# Patient Record
Sex: FEMALE | Race: WHITE | Employment: OTHER | URBAN - METROPOLITAN AREA
[De-identification: names, ages, dates, MRNs, and addresses within clinical notes are randomized per-mention and may not be internally consistent; named-entity substitution may affect disease eponyms.]

---

## 2023-01-19 ENCOUNTER — HOSPITAL ENCOUNTER (OUTPATIENT)
Dept: PREADMISSION TESTING | Age: 78
Discharge: HOME | End: 2023-01-19
Payer: MEDICARE

## 2023-01-19 ENCOUNTER — TRANSCRIBE ORDER (OUTPATIENT)
Dept: REGISTRATION | Age: 78
End: 2023-01-19

## 2023-01-19 DIAGNOSIS — N84.0 POLYP OF CORPUS UTERI: ICD-10-CM

## 2023-01-19 DIAGNOSIS — N90.9: ICD-10-CM

## 2023-01-19 DIAGNOSIS — N95.0 POSTMENOPAUSAL BLEEDING: ICD-10-CM

## 2023-01-19 DIAGNOSIS — D25.9 LEIOMYOMA OF UTERUS, UNSPECIFIED: Primary | ICD-10-CM

## 2023-01-19 DIAGNOSIS — D25.9 LEIOMYOMA OF UTERUS, UNSPECIFIED: ICD-10-CM

## 2023-01-19 LAB
ANION GAP SERPL CALC-SCNC: 2 MMOL/L (ref 3–18)
ATRIAL RATE: 73 BPM
BASOPHILS # BLD: 0 K/UL (ref 0–0.1)
BASOPHILS NFR BLD: 1 % (ref 0–2)
BUN SERPL-MCNC: 17 MG/DL (ref 7–18)
BUN/CREAT SERPL: 25 (ref 12–20)
CALCIUM SERPL-MCNC: 9.4 MG/DL (ref 8.5–10.1)
CALCULATED P AXIS, ECG09: 80 DEGREES
CALCULATED R AXIS, ECG10: 75 DEGREES
CALCULATED T AXIS, ECG11: 57 DEGREES
CHLORIDE SERPL-SCNC: 99 MMOL/L (ref 100–111)
CO2 SERPL-SCNC: 33 MMOL/L (ref 21–32)
CREAT SERPL-MCNC: 0.67 MG/DL (ref 0.6–1.3)
DIAGNOSIS, 93000: NORMAL
DIFFERENTIAL METHOD BLD: ABNORMAL
EOSINOPHIL # BLD: 0.1 K/UL (ref 0–0.4)
EOSINOPHIL NFR BLD: 2 % (ref 0–5)
ERYTHROCYTE [DISTWIDTH] IN BLOOD BY AUTOMATED COUNT: 13.3 % (ref 11.6–14.5)
GLUCOSE SERPL-MCNC: 96 MG/DL (ref 74–99)
HCT VFR BLD AUTO: 36.1 % (ref 35–45)
HGB BLD-MCNC: 11.9 G/DL (ref 12–16)
IMM GRANULOCYTES # BLD AUTO: 0 K/UL (ref 0–0.04)
IMM GRANULOCYTES NFR BLD AUTO: 1 % (ref 0–0.5)
LYMPHOCYTES # BLD: 1.2 K/UL (ref 0.9–3.6)
LYMPHOCYTES NFR BLD: 18 % (ref 21–52)
MCH RBC QN AUTO: 32.5 PG (ref 24–34)
MCHC RBC AUTO-ENTMCNC: 33 G/DL (ref 31–37)
MCV RBC AUTO: 98.6 FL (ref 78–100)
MONOCYTES # BLD: 0.8 K/UL (ref 0.05–1.2)
MONOCYTES NFR BLD: 12 % (ref 3–10)
NEUTS SEG # BLD: 4.4 K/UL (ref 1.8–8)
NEUTS SEG NFR BLD: 67 % (ref 40–73)
NRBC # BLD: 0 K/UL (ref 0–0.01)
NRBC BLD-RTO: 0 PER 100 WBC
P-R INTERVAL, ECG05: 170 MS
PLATELET # BLD AUTO: 264 K/UL (ref 135–420)
PMV BLD AUTO: 10.1 FL (ref 9.2–11.8)
POTASSIUM SERPL-SCNC: 4.5 MMOL/L (ref 3.5–5.5)
Q-T INTERVAL, ECG07: 404 MS
QRS DURATION, ECG06: 88 MS
QTC CALCULATION (BEZET), ECG08: 445 MS
RBC # BLD AUTO: 3.66 M/UL (ref 4.2–5.3)
SODIUM SERPL-SCNC: 134 MMOL/L (ref 136–145)
VENTRICULAR RATE, ECG03: 73 BPM
WBC # BLD AUTO: 6.6 K/UL (ref 4.6–13.2)

## 2023-01-19 PROCEDURE — 80048 BASIC METABOLIC PNL TOTAL CA: CPT

## 2023-01-19 PROCEDURE — 85025 COMPLETE CBC W/AUTO DIFF WBC: CPT

## 2023-01-19 PROCEDURE — 36415 COLL VENOUS BLD VENIPUNCTURE: CPT

## 2023-01-19 PROCEDURE — 93005 ELECTROCARDIOGRAM TRACING: CPT

## 2023-01-24 ENCOUNTER — HOSPITAL ENCOUNTER (OUTPATIENT)
Dept: PREADMISSION TESTING | Age: 78
Discharge: HOME | End: 2023-01-24

## 2023-01-24 VITALS — WEIGHT: 145 LBS | HEIGHT: 65 IN | BODY MASS INDEX: 24.16 KG/M2

## 2023-01-24 RX ORDER — SODIUM CHLORIDE, SODIUM LACTATE, POTASSIUM CHLORIDE, CALCIUM CHLORIDE 600; 310; 30; 20 MG/100ML; MG/100ML; MG/100ML; MG/100ML
125 INJECTION, SOLUTION INTRAVENOUS CONTINUOUS
Status: CANCELLED | OUTPATIENT
Start: 2023-01-24

## 2023-01-24 RX ORDER — AMLODIPINE BESYLATE 5 MG/1
5 TABLET ORAL DAILY
COMMUNITY

## 2023-01-24 RX ORDER — LEVOTHYROXINE SODIUM 75 UG/1
75 TABLET ORAL
COMMUNITY

## 2023-01-24 RX ORDER — ASCORBIC ACID 500 MG
500 TABLET ORAL
COMMUNITY

## 2023-01-24 RX ORDER — DEXTROMETHORPHAN HYDROBROMIDE, GUAIFENESIN 5; 100 MG/5ML; MG/5ML
1350 LIQUID ORAL 2 TIMES DAILY
COMMUNITY

## 2023-01-24 RX ORDER — GLUCOSAMINE SULFATE 1500 MG
1000 POWDER IN PACKET (EA) ORAL
COMMUNITY

## 2023-01-24 RX ORDER — LOSARTAN POTASSIUM AND HYDROCHLOROTHIAZIDE 25; 100 MG/1; MG/1
1 TABLET ORAL DAILY
COMMUNITY

## 2023-01-24 NOTE — PERIOP NOTES
Dr Mode Ruelas office called and spoke to [facility] re: requested a revised posting for surgery without abbreviations.

## 2023-01-24 NOTE — PERIOP NOTES
Operative consent filled out according to MD order on surgical posting, verbatim. Patient instructed to not bring any valuables on DOS including Pocketbook, wallet, jewelry, cell phone, lap top computer or tablet. Patient instructed not to wear make up, powders, deodorant, creams, or lotions on DOS. Patient instructed in the use of dial soap pre-op.

## 2023-02-02 NOTE — H&P (VIEW-ONLY)
[address] [address] [place] [address]  Tel: [phone]  Fax: [phone]    Patient: Iban Dinh   YOB: 1945   Birth Sex: Female      Current Gender: Female      Date:  2023 10:00 AM    Visit Type: Office Visit - GYN       This 76year old female presents for gyn visit:.    History of Present Illness  1. PMB and vulvar mass: The symptoms began on 2022 and generally lasts 8 Weeks. The location is uterine. The symptoms are described as painless. Aggravating factors include postmenopausal bleeding, leiomyoma of uterus, polyp of corpus uteri, noninflammatory disorder of vulva. The patient states the symptoms are acute and are of new onset. pt presents for pre-op visit. pt is scheduled for [facility] D&C cervical polypectomy, L labia minora excision of mass (<1cm) on 2022. Gynecologic History  Patient is postmenopausal.   No hormone replacement therapy. 2023 10:00 AM  Pregnancy # Birth order Date Delivery Type GA(wks) Labor(hrs) Weight Sex   1  [id number]     Female   2  [id number]     Female   Medical History  (Detailed)    Surgical History/Management  (Detailed)  Management Date Comments   Lumbar Spine Fusion         Problem List  Problem List reviewed.        Medications (active prior to today)  Medication Instructions Start Date Stop Date Refilled Elsewhere   Vitamin C 500 mg tablet  2022   N   Vitamin D3 25 mcg (1,000 unit) tablet  2022   N   Centrum Silver Women 8 mg iron-400 mcg-300 mcg tablet  2022   N   levothyroxine 75 mcg tablet take 1 tablet by oral route  every day 2022   N   amlodipine 5 mg tablet take 1 tablet by oral route  every day 2022   N   losartan 100 mg-hydrochlorothiazide 25 mg tablet take 1 tablet by oral route  every day 2022   N   Refresh Optive Advanced (PF) 0.5 %-1 %-0.5 % eye drops in dropperette  2022   N   GenTeal Tears Severe Gel Drops 0.4 %-0.3 % eye drops 2022   N       Medication Reconciliation  Medications reconciled today. Allergies  Ingredient Reaction (Severity) Medication Name Comment   ASPIRIN Itching           Family History    (Detailed)  Relationship Family Member Name  Age at Death Condition Onset Age Cause of Death       Family history of Stroke  N       Family history of Hypertension  N   Father    Cancer, prostate  N   Mother    Cancer, breast  N     Social History  (Detailed)  Preferred language is English. Marital Status/Family/Social Support  Marital status:        Review of Systems  System Neg/Pos Details   Constitutional Negative Chills, Fatigue and Fever. ENMT Negative Hearing loss, Otalgia and Sore throat. Eyes Negative Eye discharge, Eye pain and Vision changes. Respiratory Negative Cough, Dyspnea and Wheezing. Cardio Negative Chest pain and Edema. GI Positive Abdominal pain (Location RLQ). GI Negative Constipation, Diarrhea, Heartburn, Nausea and Vomiting.  Negative Dysuria, Hematuria, Urinary frequency, Urinary incontinence and Urinary retention. Endocrine Negative Cold intolerance, Heat intolerance, Polydipsia and Polyphagia. Neuro Negative Dizziness, Gait disturbance and Headache. Psych Negative Anxiety and Depression. Integumentary Negative Pruritus and Rash. MS Positive Rheumatologic manifestations. MS Negative Back pain, Joint pain and Joint swelling. Hema/Lymph Negative Easy bleeding and Easy bruising.        Vital Signs   Gynecologic History  Patient is postmenopausal.      Height  Time ft in cm Last Measured Height Position   10:17 AM 5.0 5.00 165.10 2022 0     Weight/BSA/BMI  Time lb oz kg Context BMI kg/m2 BSA m2   10:17 .20  63.594  23.33 1.71     Blood Pressure  Time BP mm/Hg Position Side Site Method Cuff Size   10:17 /60          Measured by  Time Measured by   10:17 AM Nicolette Gagnon         Physical Exam  Exam Findings Details   Constitutional Normal No acute distress. Well nourished. Well developed. Respiratory Normal Effort - Normal.   Psychiatric Normal Orientation - Oriented to time, place, person & situation. Appropriate mood and affect. Assessment/Plan  # Detail Type Description    1. Assessment Postmenopausal bleeding (N95.0). Patient Plan Brownish discharge/ spotting. Assoicated RLQ aching occ. I d/w patient possible etiologies, evaluation and management. TVUS reviewed w/ patient. Plans for [facility] D&C polypectomy pending auth. 11/14/22 TT --> Persistent. Q/A. Cytotec/Ibuprofen/Perc; ERx. Proceed w/ [facility] D&[facility] cervical polypectomy. 1/12/23 TT  - TVUS (10/26/22): AV uterus (5.4 x 2.5 x 2.8 cm); ET (6mm); ovaries not seen. Scattered calcifications, possible fibroids. 2. Assessment Leiomyoma of uterus (D25.9). Patient Plan See above. 3. Assessment Polyp of corpus uteri (N84.0). Patient Plan Cervical polyp/cyst.  Plans for excision. 4. Assessment Noninflammatory disorder of vulva (N90.9). Patient Plan L labia minora: 1 cm polyploidy tissue. She opts for excision. --> Patient does not recall this. Declined PE today. Will reassess prior to surgery. 1/12/23 TT         5. Assessment Postmenopausal atrophic vaginitis (N95.2). Patient Plan Atrophic Vaginitis: thinning and inflammation of the vaginal walls due to a decline in estrogen. Recommend localized estrogen treatment and vaginal moisturizers/lubricants prn. Asymptomatic. 6. Assessment Other specified hypothyroidism (E03.8). Patient Plan Stable on meds. Cont close f/u w/ PCP/Endo. 7. Assessment Essential (primary) hypertension (I10). Patient Plan Elevated despite meds. Cont close BP monitoring and f/u w/ PCP. [facility] D&C cervical polypectomy, L labia minora biopsy. 1/12/23 TT    R/B/A d/w patient.   She understands that she is at increased risk for, but not limited to, infection, bleeding, blood transfusion, and/or damage to neighboring organs. All questions answered at this time. Medications (Added, Continued or Stopped today)  Start Date Medication Directions PRN Status PRN Reason Instruction Stop Date   11/14/2022 amlodipine 5 mg tablet take 1 tablet by oral route  every day N      11/14/2022 Centrum Silver Women 8 mg iron-400 mcg-300 mcg tablet  N      01/12/2023 Cytotec 200 mcg tablet place 2 tablets in vaginally at bedtime the night prior to surgery N      11/14/2022 GenTeal Tears Severe Gel Drops 0.4 %-0.3 % eye drops  N      01/12/2023 ibuprofen 800 mg tablet take 1 tablet by oral route  every 8 hours with food as needed for pain N      11/14/2022 levothyroxine 75 mcg tablet take 1 tablet by oral route  every day N      11/14/2022 losartan 100 mg-hydrochlorothiazide 25 mg tablet take 1 tablet by oral route  every day N      01/12/2023 oxycodone-acetaminophen 5 mg-325 mg tablet take 1 - 2 tablet by oral route  every 4 - 6 hours as needed; do not exceed 6 tabs in 24 hours N      11/14/2022 Refresh Optive Advanced (PF) 0.5 %-1 %-0.5 % eye drops in dropperette  N      11/14/2022 Vitamin C 500 mg tablet  N      11/14/2022 Vitamin D3 25 mcg (1,000 unit) tablet  N        Prescription Drug Monitoring Report: Accessed by Ngozi Owens.  Génesis Kramer MD on 1/12/2023 10:24:09 AM      Provider:    Rosa Crabtree MD 01/12/2023 10:29 AM     Document generated by: Keo Moore 01/12/2023 10:28 AM      ----------------------------------------------------------------------------------------------------------------------------------------------------------------------

## 2023-02-08 ENCOUNTER — HOSPITAL ENCOUNTER (OUTPATIENT)
Age: 78
Setting detail: OUTPATIENT SURGERY
Discharge: HOME | End: 2023-02-08
Attending: OBSTETRICS & GYNECOLOGY | Admitting: OBSTETRICS & GYNECOLOGY
Payer: MEDICARE

## 2023-02-08 ENCOUNTER — ANESTHESIA (OUTPATIENT)
Dept: SURGERY | Age: 78
End: 2023-02-08
Payer: MEDICARE

## 2023-02-08 ENCOUNTER — ANESTHESIA EVENT (OUTPATIENT)
Dept: SURGERY | Age: 78
End: 2023-02-08
Payer: MEDICARE

## 2023-02-08 VITALS
RESPIRATION RATE: 9 BRPM | OXYGEN SATURATION: 100 % | TEMPERATURE: 97.6 F | BODY MASS INDEX: 23.39 KG/M2 | WEIGHT: 140.4 LBS | HEIGHT: 65 IN | HEART RATE: 67 BPM | SYSTOLIC BLOOD PRESSURE: 165 MMHG | DIASTOLIC BLOOD PRESSURE: 60 MMHG

## 2023-02-08 PROBLEM — N84.1 POLYP OF CERVIX UTERI: Chronic | Status: RESOLVED | Noted: 2023-02-08 | Resolved: 2023-02-08

## 2023-02-08 PROBLEM — N90.9 NONINFLAMMATORY DISORDER OF VULVA: Chronic | Status: ACTIVE | Noted: 2023-02-08

## 2023-02-08 PROBLEM — N84.1 POLYP OF CERVIX UTERI: Chronic | Status: ACTIVE | Noted: 2023-02-08

## 2023-02-08 PROBLEM — N90.9 NONINFLAMMATORY DISORDER OF VULVA: Chronic | Status: RESOLVED | Noted: 2023-02-08 | Resolved: 2023-02-08

## 2023-02-08 PROBLEM — N95.0 POSTMENOPAUSAL BLEEDING: Chronic | Status: ACTIVE | Noted: 2023-02-08

## 2023-02-08 PROCEDURE — 76210000020 HC REC RM PH II FIRST 0.5 HR: Performed by: OBSTETRICS & GYNECOLOGY

## 2023-02-08 PROCEDURE — 2709999900 HC NON-CHARGEABLE SUPPLY: Performed by: OBSTETRICS & GYNECOLOGY

## 2023-02-08 PROCEDURE — 74011250636 HC RX REV CODE- 250/636: Performed by: OBSTETRICS & GYNECOLOGY

## 2023-02-08 PROCEDURE — 77030020782 HC GWN BAIR PAWS FLX 3M -B: Performed by: OBSTETRICS & GYNECOLOGY

## 2023-02-08 PROCEDURE — 76060000032 HC ANESTHESIA 0.5 TO 1 HR: Performed by: OBSTETRICS & GYNECOLOGY

## 2023-02-08 PROCEDURE — 74011000250 HC RX REV CODE- 250: Performed by: OBSTETRICS & GYNECOLOGY

## 2023-02-08 PROCEDURE — 74011250636 HC RX REV CODE- 250/636

## 2023-02-08 PROCEDURE — 88305 TISSUE EXAM BY PATHOLOGIST: CPT

## 2023-02-08 PROCEDURE — 77030040361 HC SLV COMPR DVT MDII -B: Performed by: OBSTETRICS & GYNECOLOGY

## 2023-02-08 PROCEDURE — 77030005537 HC CATH URETH BARD -A: Performed by: OBSTETRICS & GYNECOLOGY

## 2023-02-08 PROCEDURE — 77010033678 HC OXYGEN DAILY

## 2023-02-08 PROCEDURE — 77030019927 HC TBNG IRR CYSTO BAXT -A: Performed by: OBSTETRICS & GYNECOLOGY

## 2023-02-08 PROCEDURE — 77030012508 HC MSK AIRWY LMA AMBU -A: Performed by: SPECIALIST

## 2023-02-08 PROCEDURE — 74011000250 HC RX REV CODE- 250

## 2023-02-08 PROCEDURE — 76010000138 HC OR TIME 0.5 TO 1 HR: Performed by: OBSTETRICS & GYNECOLOGY

## 2023-02-08 PROCEDURE — 76210000063 HC OR PH I REC FIRST 0.5 HR: Performed by: OBSTETRICS & GYNECOLOGY

## 2023-02-08 RX ORDER — NALOXONE HYDROCHLORIDE 0.4 MG/ML
0.1 INJECTION, SOLUTION INTRAMUSCULAR; INTRAVENOUS; SUBCUTANEOUS
Status: DISCONTINUED | OUTPATIENT
Start: 2023-02-08 | End: 2023-02-08 | Stop reason: HOSPADM

## 2023-02-08 RX ORDER — OXYCODONE AND ACETAMINOPHEN 5; 325 MG/1; MG/1
1 TABLET ORAL AS NEEDED
Status: DISCONTINUED | OUTPATIENT
Start: 2023-02-08 | End: 2023-02-08 | Stop reason: HOSPADM

## 2023-02-08 RX ORDER — FENTANYL CITRATE 50 UG/ML
INJECTION, SOLUTION INTRAMUSCULAR; INTRAVENOUS AS NEEDED
Status: DISCONTINUED | OUTPATIENT
Start: 2023-02-08 | End: 2023-02-08 | Stop reason: HOSPADM

## 2023-02-08 RX ORDER — ONDANSETRON 2 MG/ML
4 INJECTION INTRAMUSCULAR; INTRAVENOUS ONCE
Status: DISCONTINUED | OUTPATIENT
Start: 2023-02-08 | End: 2023-02-08 | Stop reason: HOSPADM

## 2023-02-08 RX ORDER — KETOROLAC TROMETHAMINE 15 MG/ML
INJECTION, SOLUTION INTRAMUSCULAR; INTRAVENOUS AS NEEDED
Status: DISCONTINUED | OUTPATIENT
Start: 2023-02-08 | End: 2023-02-08 | Stop reason: HOSPADM

## 2023-02-08 RX ORDER — HYDROMORPHONE HYDROCHLORIDE 1 MG/ML
0.5 INJECTION, SOLUTION INTRAMUSCULAR; INTRAVENOUS; SUBCUTANEOUS
Status: DISCONTINUED | OUTPATIENT
Start: 2023-02-08 | End: 2023-02-08 | Stop reason: HOSPADM

## 2023-02-08 RX ORDER — MIDAZOLAM HYDROCHLORIDE 1 MG/ML
INJECTION, SOLUTION INTRAMUSCULAR; INTRAVENOUS AS NEEDED
Status: DISCONTINUED | OUTPATIENT
Start: 2023-02-08 | End: 2023-02-08 | Stop reason: HOSPADM

## 2023-02-08 RX ORDER — BUPIVACAINE HYDROCHLORIDE 2.5 MG/ML
INJECTION, SOLUTION EPIDURAL; INFILTRATION; INTRACAUDAL AS NEEDED
Status: DISCONTINUED | OUTPATIENT
Start: 2023-02-08 | End: 2023-02-08 | Stop reason: HOSPADM

## 2023-02-08 RX ORDER — DEXAMETHASONE SODIUM PHOSPHATE 4 MG/ML
INJECTION, SOLUTION INTRA-ARTICULAR; INTRALESIONAL; INTRAMUSCULAR; INTRAVENOUS; SOFT TISSUE AS NEEDED
Status: DISCONTINUED | OUTPATIENT
Start: 2023-02-08 | End: 2023-02-08 | Stop reason: HOSPADM

## 2023-02-08 RX ORDER — LIDOCAINE HYDROCHLORIDE 20 MG/ML
INJECTION, SOLUTION EPIDURAL; INFILTRATION; INTRACAUDAL; PERINEURAL AS NEEDED
Status: DISCONTINUED | OUTPATIENT
Start: 2023-02-08 | End: 2023-02-08 | Stop reason: HOSPADM

## 2023-02-08 RX ORDER — ONDANSETRON 2 MG/ML
INJECTION INTRAMUSCULAR; INTRAVENOUS AS NEEDED
Status: DISCONTINUED | OUTPATIENT
Start: 2023-02-08 | End: 2023-02-08 | Stop reason: HOSPADM

## 2023-02-08 RX ORDER — PROPOFOL 10 MG/ML
INJECTION, EMULSION INTRAVENOUS AS NEEDED
Status: DISCONTINUED | OUTPATIENT
Start: 2023-02-08 | End: 2023-02-08 | Stop reason: HOSPADM

## 2023-02-08 RX ORDER — FENTANYL CITRATE 50 UG/ML
25 INJECTION, SOLUTION INTRAMUSCULAR; INTRAVENOUS
Status: DISCONTINUED | OUTPATIENT
Start: 2023-02-08 | End: 2023-02-08 | Stop reason: HOSPADM

## 2023-02-08 RX ORDER — SODIUM CHLORIDE, SODIUM LACTATE, POTASSIUM CHLORIDE, CALCIUM CHLORIDE 600; 310; 30; 20 MG/100ML; MG/100ML; MG/100ML; MG/100ML
50 INJECTION, SOLUTION INTRAVENOUS CONTINUOUS
Status: DISCONTINUED | OUTPATIENT
Start: 2023-02-08 | End: 2023-02-08 | Stop reason: HOSPADM

## 2023-02-08 RX ORDER — SODIUM CHLORIDE, SODIUM LACTATE, POTASSIUM CHLORIDE, CALCIUM CHLORIDE 600; 310; 30; 20 MG/100ML; MG/100ML; MG/100ML; MG/100ML
125 INJECTION, SOLUTION INTRAVENOUS CONTINUOUS
Status: DISCONTINUED | OUTPATIENT
Start: 2023-02-08 | End: 2023-02-08 | Stop reason: HOSPADM

## 2023-02-08 RX ADMIN — PROPOFOL 120 MG: 10 INJECTION, EMULSION INTRAVENOUS at 15:17

## 2023-02-08 RX ADMIN — LIDOCAINE HYDROCHLORIDE 80 MG: 20 INJECTION, SOLUTION EPIDURAL; INFILTRATION; INTRACAUDAL; PERINEURAL at 15:17

## 2023-02-08 RX ADMIN — SODIUM CHLORIDE, POTASSIUM CHLORIDE, SODIUM LACTATE AND CALCIUM CHLORIDE 125 ML/HR: 600; 310; 30; 20 INJECTION, SOLUTION INTRAVENOUS at 13:16

## 2023-02-08 RX ADMIN — MIDAZOLAM 2 MG: 1 INJECTION INTRAMUSCULAR; INTRAVENOUS at 15:13

## 2023-02-08 RX ADMIN — FENTANYL CITRATE 25 MCG: 50 INJECTION, SOLUTION INTRAMUSCULAR; INTRAVENOUS at 15:21

## 2023-02-08 RX ADMIN — ONDANSETRON HYDROCHLORIDE 4 MG: 2 INJECTION INTRAMUSCULAR; INTRAVENOUS at 15:40

## 2023-02-08 RX ADMIN — KETOROLAC TROMETHAMINE 15 MG: 15 INJECTION, SOLUTION INTRAMUSCULAR; INTRAVENOUS at 15:40

## 2023-02-08 RX ADMIN — FENTANYL CITRATE 25 MCG: 50 INJECTION, SOLUTION INTRAMUSCULAR; INTRAVENOUS at 15:15

## 2023-02-08 RX ADMIN — DEXAMETHASONE SODIUM PHOSPHATE 8 MG: 4 INJECTION, SOLUTION INTRAMUSCULAR; INTRAVENOUS at 15:25

## 2023-02-08 NOTE — ANESTHESIA PREPROCEDURE EVALUATION
Relevant Problems   No relevant active problems       Anesthetic History               Review of Systems / Medical History  Patient summary reviewed, nursing notes reviewed and pertinent labs reviewed    Pulmonary  Within defined limits                 Neuro/Psych   Within defined limits           Cardiovascular    Hypertension              Exercise tolerance: >4 METS     GI/Hepatic/Renal  Within defined limits              Endo/Other      Hypothyroidism  Arthritis     Other Findings              Physical Exam    Airway  Mallampati: II  TM Distance: 4 - 6 cm  Neck ROM: normal range of motion   Mouth opening: Normal     Cardiovascular               Dental  No notable dental hx       Pulmonary                 Abdominal         Other Findings            Anesthetic Plan    ASA: 2  Anesthesia type: general          Induction: Intravenous  Anesthetic plan and risks discussed with: Patient

## 2023-02-08 NOTE — PERIOP NOTES
Reviewed PTA medication list with patient/caregiver and patient/caregiver denies any additional medications. Patient admits to having a responsible adult care for them at home for at least 24 hours after surgery. Patient encouraged to use gown warming system and informed that using said warming gown to regulate body temperature prior to a procedure has been shown to help reduce the risks of blood clots and infection. Patient's pharmacy of choice verified and documented in PTA medication section.     Dual skin assessment & fall risk band verification completed with Camilla Danielle RN.

## 2023-02-08 NOTE — PERIOP NOTES
TRANSFER - IN REPORT:    Verbal report received from CRNA and OR nurse(name) on [facility]  being received from OR(unit) for routine post - op      Report consisted of patients Situation, Background, Assessment and   Recommendations(SBAR). Information from the following report(s) SBAR, Kardex, OR Summary, Procedure Summary, Intake/Output, and MAR was reviewed with the receiving nurse. Opportunity for questions and clarification was provided. Assessment completed upon patients arrival to unit and care assumed.

## 2023-02-08 NOTE — DISCHARGE INSTRUCTIONS
[facility], [address] [facility], [address], [address], [place], [address] [facility]  Office: [phone]  Fax:    [phone]    PROCEDURE: Procedure(s): HYSTEROSCOPY, DILATATION AND CURETTAGE  CERVICAL POLYPECTOMY, LEFT LABIA MINORA EXCISION OF MASS < 1CM)    Notify [facility] IMMEDIATELY if any of the following occur: You are unable to urinate. Urgency to urinate is not uncommon. Excessive vaginal bleeding > 1 maxi pad an hour for more then 2 hours straight. Temperature above 101.0° and / or chills. You are nauseous and / or vomiting and you cannot hold down any fluids. Your pain is not controlled with the pain medication prescribed. Special Considerations:     Do not drive for at least 24 hours after the procedure and until you are no longer taking narcotic pain medication and you are able to move and react without hesitation. You may return to work the following day. [x] Pelvic rest (nothing in the vagina) for 2 weeks. [] No heavy lifting over 10 pounds & no strenuous exercise for 6 weeks. [] Other instructions:         MEDICATIONS: PROVIDED AT DISCHARGE OR PREVIOUSLY PRESCRIBED AT PRE OPERATIVE APPOINTMENT WITH [facility] --  Narcotic Pain Med.   []  Vicodin®   [x]  Percocet®   []  Dilaudid®    Non-narcotic Pain Med. [x]   Ibuprofen        Antibiotics   []  Cipro®   []  Keflex®     []  Bactrim DS®       Urgency   []   Vesicare®       Nausea      []    Zofran®     []    Phenergan®       Other   []    Colace    Aquaphor - over the counter medication to apply to the vulvar area as needed to protect the skin to prevent itching and infection. If you have not already scheduled your post operative appointment please do so with our office staff. Your post partum appointment should be in 2 weeks.     Please contact [facility] at [phone] or go to the nearest Emergency Department / Urgent Care facility for any other medical questions or concerns. DISCHARGE SUMMARY from Nurse    PATIENT INSTRUCTIONS:    After general anesthesia or intravenous sedation, for 24 hours or while taking prescription Narcotics:  Limit your activities  Do not drive and operate hazardous machinery  Do not make important personal or business decisions  Do  not drink alcoholic beverages  If you have not urinated within 8 hours after discharge, please contact your surgeon on call. Report the following to your surgeon:  Excessive pain, swelling, redness or odor of or around the surgical area  Temperature over 100.5  Nausea and vomiting lasting longer than 4 hours or if unable to take medications  Any signs of decreased circulation or nerve impairment to extremity: change in color, persistent  numbness, tingling, coldness or increase pain  Any questions    What to do at Home:  Recommended activity: Activity as tolerated        *  Please give a list of your current medications to your Primary Care Provider. *  Please update this list whenever your medications are discontinued, doses are      changed, or new medications (including over-the-counter products) are added. *  Please carry medication information at all times in case of emergency situations. These are general instructions for a healthy lifestyle:    No smoking/ No tobacco products/ Avoid exposure to second hand smoke  Surgeon General's Warning:  Quitting smoking now greatly reduces serious risk to your health.     Obesity, smoking, and sedentary lifestyle greatly increases your risk for illness    A healthy diet, regular physical exercise & weight monitoring are important for maintaining a healthy lifestyle    You may be retaining fluid if you have a history of heart failure or if you experience any of the following symptoms:  Weight gain of 3 pounds or more overnight or 5 pounds in a week, increased swelling in our hands or feet or shortness of breath while lying flat in bed. Please call your doctor as soon as you notice any of these symptoms; do not wait until your next office visit. The discharge information has been reviewed with the patient and caregiver. The patient and caregiver verbalized understanding. Discharge medications reviewed with the patient and caregiver and appropriate educational materials and side effects teaching were provided.     Patient armband removed and shredded

## 2023-02-08 NOTE — ANESTHESIA POSTPROCEDURE EVALUATION
Post-Anesthesia Evaluation and Assessment    Cardiovascular Function/Vital Signs  Visit Vitals  BP (!) 147/61   Pulse 67   Temp 36.4 °C (97.6 °F)   Resp 12   Ht 5' 4.5\" (1.638 m)   Wt 63.7 kg (140 lb 6.4 oz)   SpO2 100%   BMI 23.73 kg/m²       Patient is status post Procedure(s): HYSTEROSCOPY, DILATATION AND CURETTAGE  CERVICAL POLYPECTOMY, LEFT LABIA MINORA EXCISION OF MASS < 1CM). Nausea/Vomiting: Controlled. Postoperative hydration reviewed and adequate. Pain:  Pain Scale 1: Numeric (0 - 10) (02/08/23 1613)  Pain Intensity 1: 0 (02/08/23 1613)   Managed. Neurological Status:   Neuro (WDL): Within Defined Limits (02/08/23 1613)   At baseline. Mental Status and Level of Consciousness: Baseline and appropriate for discharge. Pulmonary Status:   O2 Device: None (Room air) (02/08/23 1613)   Adequate oxygenation and airway patent. Complications related to anesthesia: None    Post-anesthesia assessment completed. No concerns. Patient has met all discharge requirements.     Signed By: Casimiro Porras MD    February 8, 2023

## 2023-02-08 NOTE — INTERVAL H&P NOTE
Update History & Physical    The Patient's History and Physical of January 12, 2023 was reviewed with the patient and I examined the patient. There was no change. The surgical site was confirmed by the patient and me. Plan:  The risk, benefits, expected outcome, and alternative to the recommended procedure have been discussed with the patient. Patient understands and wants to proceed with the procedure.     Electronically signed by Rosangela Holliday MD on 2/8/2023 at 2:56 PM

## 2023-02-09 NOTE — OP NOTES
[facility] [place]  OPERATIVE REPORT    Name:  Son Friday  MR#:   [id number]  :  1945  ACCOUNT #:  [de-identified]  DATE OF SERVICE:  2023    PREOPERATIVE DIAGNOSES:  1. Postmenopausal bleeding. 2.  Endocervical polyp. 3.  Left polypoid mass of the left labia minora. POSTOPERATIVE DIAGNOSES:  1. Postmenopausal bleeding. 2.  Endocervical polyp. 3.  Left polypoid mass of the left labia minora. PROCEDURE PERFORMED:  1. Hysteroscopy, dilation and curettage, cervical polypectomy. 2.  Left labia minora excision of mass. SURGEON:  Gini Mackey MD    ASSISTANT:  ***. ANESTHESIOLOGIST:  Joni Marquez MD    ANESTHESIA:  General and paracervical block. COMPLICATIONS:  None. SPECIMENS REMOVED:  1.  Endometrial curettings. 2.  Endocervical polyps. 3.  Left labia minora mass. IMPLANTS:  None. ESTIMATED BLOOD LOSS:  Minimal.    IV FLUIDS:  700 mL of lactated Ringer's. FINDINGS:  1.  A small anteverted uterus with normal-appearing ostia bilaterally and overall unremarkable endometrial cavity. 2.  Endocervical polyp noted along the 7 to 9 o'clock position, removed entirely by the end of the case. 3.  Left labia minora 1 cm polypoid tissue noted, excised completely. INDICATIONS:  This is a 64-year-old complaining of brownish discharge and spotting associated with right lower quadrant aching that is on and off. She had a transvaginal ultrasound on 10/26/2022 demonstrating anteverted uterus, 5.4 x 2.5 x 2.8 cm, endometrial thickness 6 mm. Ovaries not seen. Scattered calcification. Possible fibroids. On physical exam, she was noted to have an endocervical polyp as well as a left labia minora polypoid mass. All of these findings were reviewed with the patient. Risks, benefits, and alternatives were discussed, and she opted for surgical management. As stated above, all questions were answered prior to surgical start time.     PROCEDURE:  The patient was taken to the OR where general anesthesia was obtained without difficulty. She was prepared and draped in the usual sterile fashion in dorsal lithotomy position with legs in stirrups. A weighted speculum was placed in the vagina and Bertrand retractor in the anterior fornix to expose the cervix. The anterior lip of the cervix was grasped with a single-tooth tenaculum and a paracervical block was performed using 20 mL of 0.25% Marcaine plain. The cervical os was gradually dilated to allow introduction of the hysteroscope, which was advanced into the uterus under direct visualization with the water running. The above findings were noted. The hysteroscope was removed. A gentle curettage was performed until gritty texture was noted throughout. Specimen was handed off. Colposcopy biopsy device was then used to perform the polypectomy with removal of the polyp entirely at the base. The specimen was handed off. Instruments were removed. Excellent hemostasis was noted. Attention was then turned to the left labia minora where a less than 1 cm polypoid tissue was noted. A hemostat was then used to create a crush injury at the base of this mass. A scalpel was used to excise along the base of the mass and then very minimal cautery was used to obtain excellent hemostasis. Specimen was handed off. The patient tolerated the procedure well. Sponge, lap, needle, and instrument counts were correct x2. She was transferred to the recovery area in a stable condition.       Lilliana Cloud MD      [id number]/  D:  02/08/2023 15:59  T:  02/09/2023 3:01  JOB #:  [id number]

## (undated) DEVICE — GARMENT,MEDLINE,DVT,INT,CALF,MED, GEN2: Brand: MEDLINE

## (undated) DEVICE — SOL INJ LR 1000ML --

## (undated) DEVICE — GLOVE SURG SZ 65 THK91MIL LTX FREE SYN POLYISOPRENE

## (undated) DEVICE — D&C HYSTEROSCOPY: Brand: MEDLINE INDUSTRIES, INC.

## (undated) DEVICE — Z DISCONTINUED USE 2270995 CATHETER URETH 16FR L16IN RED RUB INTMIT RND HLLW TIP 2 OPP

## (undated) DEVICE — CYSTO/BLADDER IRRIGATION SET, REGULATING CLAMP